# Patient Record
Sex: FEMALE | Race: WHITE
[De-identification: names, ages, dates, MRNs, and addresses within clinical notes are randomized per-mention and may not be internally consistent; named-entity substitution may affect disease eponyms.]

---

## 2020-11-20 ENCOUNTER — TRANSCRIPTION ENCOUNTER (OUTPATIENT)
Age: 25
End: 2020-11-20

## 2021-05-23 ENCOUNTER — TRANSCRIPTION ENCOUNTER (OUTPATIENT)
Age: 26
End: 2021-05-23

## 2021-06-16 ENCOUNTER — TRANSCRIPTION ENCOUNTER (OUTPATIENT)
Age: 26
End: 2021-06-16

## 2023-01-31 PROBLEM — Z00.00 ENCOUNTER FOR PREVENTIVE HEALTH EXAMINATION: Status: ACTIVE | Noted: 2023-01-31

## 2023-02-14 ENCOUNTER — NON-APPOINTMENT (OUTPATIENT)
Age: 28
End: 2023-02-14

## 2023-02-14 ENCOUNTER — APPOINTMENT (OUTPATIENT)
Dept: NEUROSURGERY | Facility: CLINIC | Age: 28
End: 2023-02-14
Payer: MEDICAID

## 2023-02-14 VITALS
OXYGEN SATURATION: 99 % | SYSTOLIC BLOOD PRESSURE: 120 MMHG | WEIGHT: 150 LBS | HEART RATE: 77 BPM | BODY MASS INDEX: 25.61 KG/M2 | HEIGHT: 64 IN | TEMPERATURE: 98 F | DIASTOLIC BLOOD PRESSURE: 73 MMHG

## 2023-02-14 PROCEDURE — 99203 OFFICE O/P NEW LOW 30 MIN: CPT

## 2023-02-16 NOTE — ASSESSMENT
[FreeTextEntry1] : Ms. Dhillon presents with above history.  She is neurologically intact but she does have some diminished sensation in the L5 and S1 distribution on the left leg.  She has an x-ray of the lumbar spine that does not reveal any evidence of fracture or dislocation.  I would like her to obtain an MRI of the lumbar spine to further evaluate her left lower extremity paresthesias that is not improving with conservative measures.  In the interim she will continue with her regimen.  And I will see her back after her imaging.\par Patient has been given an opportunity to ask and have their questions answered to their satisfaction.\par Patient knows to call the office if there are any new or worsening symptoms.\par

## 2023-02-16 NOTE — PHYSICAL EXAM
[General Appearance - Alert] : alert [General Appearance - In No Acute Distress] : in no acute distress [Oriented To Time, Place, And Person] : oriented to person, place, and time [Impaired Insight] : insight and judgment were intact [Affect] : the affect was normal [Person] : oriented to person [Place] : oriented to place [Time] : oriented to time [Short Term Intact] : short term memory intact [Remote Intact] : remote memory intact [Span Intact] : the attention span was normal [Concentration Intact] : normal concentrating ability [Fluency] : fluency intact [Comprehension] : comprehension intact [Current Events] : adequate knowledge of current events [Past History] : adequate knowledge of personal past history [Vocabulary] : adequate range of vocabulary [Cranial Nerves Optic (II)] : visual acuity intact bilaterally,  pupils equal round and reactive to light [Cranial Nerves Oculomotor (III)] : extraocular motion intact [Cranial Nerves Trigeminal (V)] : facial sensation intact symmetrically [Cranial Nerves Facial (VII)] : face symmetrical [Cranial Nerves Glossopharyngeal (IX)] : tongue and palate midline [Cranial Nerves Accessory (XI - Cranial And Spinal)] : head turning and shoulder shrug symmetric [Cranial Nerves Hypoglossal (XII)] : there was no tongue deviation with protrusion [Motor Tone] : muscle tone was normal in all four extremities [Motor Strength] : muscle strength was normal in all four extremities [No Muscle Atrophy] : normal bulk in all four extremities [4] : L4/5 extensor hallucis longus 4/5 [5] : S1 toe walking 5/5 [Sensation Tactile Decrease] : light touch was intact [Abnormal Walk] : normal gait [Balance] : balance was intact [2+] : Triceps left 2+ [3+] : Patella left 3+ [No Visual Abnormalities] : no visible abnormailities [Past-pointing] : there was no past-pointing [Tremor] : no tremor present [FreeTextEntry8] : diminshed sensation in a L5- S1 distribution on left leg

## 2023-02-16 NOTE — HISTORY OF PRESENT ILLNESS
[> 3 months] : more  than 3 months [FreeTextEntry1] : Left lumbar radiculopathy [de-identified] : Karma Dhillon is a 27 year old female presents for initial neurosurgical evaluation of left lumbar radiculopathy.  No significant medical history.  She mentions her symptoms began about 3-1/2 months ago.  She does not recall any inciting event or trauma.  She states she has always been active in sports and involved in gym activities but has noticed some limitations in regards to the left lower extremity.  She states that her pain starts in the lower back and the left side of the paraspinal muscles radiates to the buttock and the posterior lateral thigh and sometimes extends past the knee.  The most painful area is concentrated directly behind the knee.  It is described as burning and hot and constant in nature.  Pain intensity is 6 out of 10 and at its worst 10 out of 10.  She does not endorse any right lower extremity pain.  Denies any urinary or bowel dysfunction.  She is ambulating independently but she is feels sometimes that the left leg is not as strong to support her.  She has been involved in at home physical therapy exercises involving the core hamstrings quadriceps with no improvement for the last 7 weeks.  She has never tried any pain management.  She manages her symptoms with NSAIDs.  She has not had any previous imaging dedicated to the lumbar spine.  She has never had an EMG.  She denies any smoking history.\par

## 2023-02-16 NOTE — REASON FOR VISIT
[New Patient Visit] : a new patient visit [Referred By: _________] : Patient was referred by [facility] [FreeTextEntry1] : Left lumbar radiculopathy

## 2023-02-20 ENCOUNTER — APPOINTMENT (OUTPATIENT)
Dept: MRI IMAGING | Facility: CLINIC | Age: 28
End: 2023-02-20
Payer: MEDICAID

## 2023-02-20 ENCOUNTER — OUTPATIENT (OUTPATIENT)
Dept: OUTPATIENT SERVICES | Facility: HOSPITAL | Age: 28
LOS: 1 days | End: 2023-02-20

## 2023-02-20 ENCOUNTER — TRANSCRIPTION ENCOUNTER (OUTPATIENT)
Age: 28
End: 2023-02-20

## 2023-02-20 DIAGNOSIS — M54.16 RADICULOPATHY, LUMBAR REGION: ICD-10-CM

## 2023-02-20 PROCEDURE — 72148 MRI LUMBAR SPINE W/O DYE: CPT | Mod: 26

## 2023-02-21 DIAGNOSIS — Z78.9 OTHER SPECIFIED HEALTH STATUS: ICD-10-CM

## 2023-02-27 ENCOUNTER — NON-APPOINTMENT (OUTPATIENT)
Age: 28
End: 2023-02-27

## 2023-03-01 ENCOUNTER — NON-APPOINTMENT (OUTPATIENT)
Age: 28
End: 2023-03-01

## 2023-03-02 ENCOUNTER — APPOINTMENT (OUTPATIENT)
Dept: NEUROSURGERY | Facility: CLINIC | Age: 28
End: 2023-03-02
Payer: MEDICAID

## 2023-03-02 VITALS
HEIGHT: 64 IN | BODY MASS INDEX: 25.61 KG/M2 | OXYGEN SATURATION: 98 % | WEIGHT: 150 LBS | TEMPERATURE: 98.6 F | SYSTOLIC BLOOD PRESSURE: 124 MMHG | HEART RATE: 81 BPM | DIASTOLIC BLOOD PRESSURE: 84 MMHG

## 2023-03-02 DIAGNOSIS — M54.16 RADICULOPATHY, LUMBAR REGION: ICD-10-CM

## 2023-03-02 PROCEDURE — 99214 OFFICE O/P EST MOD 30 MIN: CPT

## 2023-03-02 RX ORDER — GABAPENTIN 300 MG/1
300 CAPSULE ORAL 3 TIMES DAILY
Qty: 90 | Refills: 3 | Status: ACTIVE | COMMUNITY
Start: 2023-03-02 | End: 1900-01-01

## 2023-03-03 PROBLEM — M54.16 LEFT LUMBAR RADICULOPATHY: Status: ACTIVE | Noted: 2023-02-14

## 2023-03-03 NOTE — REVIEW OF SYSTEMS
128 [As Noted in HPI] : as noted in HPI [Leg Weakness] : leg weakness [Numbness] : numbness [Tingling] : tingling [Negative] : Heme/Lymph

## 2023-03-03 NOTE — ASSESSMENT
[FreeTextEntry1] : Ms. Dhillon presents with above history.  She is neurologically intact but she does have some diminished sensation in the L5 and S1 distribution on the left leg.  I have personally reviewed her MRI of the lumbar spine which reveals a left-sided L5-S1 disc herniation that is consistent with her symptoms.  At this time we discussed options in terms of conservative measures versus surgical intervention she wishes to opt to continue those conservative measures such as functional medicine we will increase the gabapentin to 300 mg 3 times a day and physiatry referral was provided to Dr. Deny Suh.  She knows if she fails conservative measures she may be an appropriate candidate for a left L5-S1 discectomy.  She had a brief opportunity to meet Dr. Roland as well during the visit\par Patient has been given an opportunity to ask and have their questions answered to their satisfaction.\par Patient knows to call the office if there are any new or worsening symptoms.\par

## 2023-03-03 NOTE — HISTORY OF PRESENT ILLNESS
[> 3 months] : more  than 3 months [FreeTextEntry1] : Left lumbar radiculopathy [de-identified] : Karma Dhillon is a 27 year old female presents for initial neurosurgical evaluation of left lumbar radiculopathy.  No significant medical history.  She mentions her symptoms began about 3-1/2 months ago.  She does not recall any inciting event or trauma.  She states she has always been active in sports and involved in gym activities but has noticed some limitations in regards to the left lower extremity.  She states that her pain starts in the lower back and the left side of the paraspinal muscles radiates to the buttock and the posterior lateral thigh and sometimes extends past the knee.  The most painful area is concentrated directly behind the knee.  It is described as burning and hot and constant in nature.  Pain intensity is 6 out of 10 and at its worst 10 out of 10.  She does not endorse any right lower extremity pain.  Denies any urinary or bowel dysfunction.  She is ambulating independently but she is feels sometimes that the left leg is not as strong to support her.  She has been involved in at home physical therapy exercises involving the core hamstrings quadriceps with no improvement for the last 7 weeks.  She has never tried any pain management.  She manages her symptoms with NSAIDs.  She has not had any previous imaging dedicated to the lumbar spine.  She has never had an EMG.  She denies any smoking history.\par

## 2023-03-03 NOTE — REASON FOR VISIT
[Follow-Up: _____] : a [unfilled] follow-up visit [New Patient Visit] : a new patient visit [Referred By: _________] : Patient was referred by [facility] [FreeTextEntry1] : Left lumbar radiculopathy

## 2023-03-03 NOTE — DATA REVIEWED
[de-identified] : EXAM: [id number] - MR SPINE LUMBAR - ORDERED BY: SHANELL CIFUENTES\par \par \par PROCEDURE DATE: 02/20/2023\par \par \par \par INTERPRETATION: CLINICAL INFORMATION: Lumbar radiculopathy\par \par ADDITIONAL CLINICAL INFORMATION: Not Applicable\par \par TECHNIQUE: Multiplanar, multisequence MRI was performed of the lumbar spine.\par IV Contrast: NONE\par \par PRIOR STUDIES: No priors available for comparison.\par \par FINDINGS: Conus terminates at the L1 level and is normal in signal. Vertebral body heights are maintained. Alignment is normal.\par \par T12-L1 through L4-L5: No bulging or herniated intervertebral discs. No spinal canal or foraminal narrowing.\par \par L5-S1: Disc degeneration and moderate disc height loss. Moderate-sized left paracentral disc herniation which slightly posteriorly displaces the descending left S1 nerve root. This is superimposed on a small disc bulge with osseous ridging. Mild left foraminal narrowing. No spinal canal narrowing.\par \par The imaged portions of the sacroiliac joints are unremarkable.\par \par There is no paraspinal muscle atrophy or edema.\par \par There is a left renal cyst.\par \par IMPRESSION:\par \par Moderate-sized left paracentral disc herniation at L5-S1 which slightly posteriorly displaces the descending left S1 nerve root.

## 2023-03-29 ENCOUNTER — RX RENEWAL (OUTPATIENT)
Age: 28
End: 2023-03-29

## 2023-03-29 RX ORDER — DICLOFENAC SODIUM 75 MG/1
75 TABLET, DELAYED RELEASE ORAL
Qty: 30 | Refills: 0 | Status: ACTIVE | COMMUNITY
Start: 2023-03-02

## 2023-09-11 ENCOUNTER — NON-APPOINTMENT (OUTPATIENT)
Age: 28
End: 2023-09-11

## 2023-09-13 ENCOUNTER — NON-APPOINTMENT (OUTPATIENT)
Age: 28
End: 2023-09-13

## 2023-09-18 ENCOUNTER — NON-APPOINTMENT (OUTPATIENT)
Age: 28
End: 2023-09-18

## 2024-12-25 PROBLEM — F10.90 ALCOHOL USE: Status: ACTIVE | Noted: 2023-02-14

## 2025-05-04 ENCOUNTER — NON-APPOINTMENT (OUTPATIENT)
Age: 30
End: 2025-05-04